# Patient Record
Sex: MALE | Race: WHITE | Employment: OTHER | URBAN - METROPOLITAN AREA
[De-identification: names, ages, dates, MRNs, and addresses within clinical notes are randomized per-mention and may not be internally consistent; named-entity substitution may affect disease eponyms.]

---

## 2018-09-10 NOTE — PATIENT DISCUSSION
start lid hygiene BID and Maxitrol ointment QHS OD and Pred Forte Gtts OD QID all scripts issued to patient.

## 2019-10-05 ENCOUNTER — NEW PATIENT (OUTPATIENT)
Dept: URBAN - METROPOLITAN AREA CLINIC 26 | Facility: CLINIC | Age: 84
End: 2019-10-05

## 2019-10-05 VITALS — HEIGHT: 67 IN | WEIGHT: 103 LBS | BODY MASS INDEX: 16.17 KG/M2

## 2019-10-05 DIAGNOSIS — H35.341: ICD-10-CM

## 2019-10-05 DIAGNOSIS — H04.123: ICD-10-CM

## 2019-10-05 DIAGNOSIS — H35.373: ICD-10-CM

## 2019-10-05 DIAGNOSIS — H02.834: ICD-10-CM

## 2019-10-05 DIAGNOSIS — G45.3: ICD-10-CM

## 2019-10-05 DIAGNOSIS — H43.813: ICD-10-CM

## 2019-10-05 DIAGNOSIS — H02.831: ICD-10-CM

## 2019-10-05 DIAGNOSIS — H35.3131: ICD-10-CM

## 2019-10-05 PROCEDURE — 99204 OFFICE O/P NEW MOD 45 MIN: CPT

## 2019-10-05 PROCEDURE — 92250 FUNDUS PHOTOGRAPHY W/I&R: CPT

## 2019-10-05 ASSESSMENT — VISUAL ACUITY
OS_CC: 20/25
OD_CC: 20/25

## 2019-10-05 ASSESSMENT — TONOMETRY
OD_IOP_MMHG: 14
OS_IOP_MMHG: 20

## 2019-10-22 ENCOUNTER — ADDENDUM (OUTPATIENT)
Dept: URBAN - METROPOLITAN AREA CLINIC 26 | Facility: CLINIC | Age: 84
End: 2019-10-22

## 2022-06-04 ENCOUNTER — TELEPHONE ENCOUNTER (OUTPATIENT)
Dept: URBAN - METROPOLITAN AREA CLINIC 68 | Facility: CLINIC | Age: 87
End: 2022-06-04

## 2022-06-04 RX ORDER — THIAMINE HCL 50 MG
VITAMIN B-1( 250MG ORAL  AS DIRECTED ) INACTIVE -HX ENTRY TABLET ORAL AS DIRECTED
OUTPATIENT
Start: 2019-03-18

## 2022-06-04 RX ORDER — POLYETHYLENE GLYCOL 3350 17 G/DOSE
MIRALAX(  ORAL 3 GM QD PRN AS NEEDED ) INACTIVE -HX ENTRY POWDER (GRAM) ORAL AS NEEDED
OUTPATIENT
Start: 2019-03-18

## 2022-06-04 RX ORDER — METRONIDAZOLE 500 MG/1
TABLET, FILM COATED ORAL
Qty: 21 | Refills: 0 | OUTPATIENT
Start: 2019-03-29 | End: 2019-04-05

## 2022-06-04 RX ORDER — VANCOMYCIN HYDROCHLORIDE 125 MG/1
CAPSULE ORAL EVERY 6 HOURS
Qty: 40 | Refills: 0 | OUTPATIENT
Start: 2019-03-25 | End: 2019-04-04

## 2022-06-04 RX ORDER — FIDAXOMICIN 200 MG/1
TABLET, FILM COATED ORAL
Qty: 28 | Refills: 0 | OUTPATIENT
Start: 2019-04-08 | End: 2019-04-22

## 2022-06-04 RX ORDER — OXYCODONE HYDROCHLORIDE AND ACETAMINOPHEN 7.5; 325 MG/1; MG/1
PERCOCET( 7.5-325MG ORAL  AS NEEDED ) INACTIVE -HX ENTRY TABLET ORAL AS NEEDED
OUTPATIENT
Start: 2019-03-18

## 2022-06-05 ENCOUNTER — TELEPHONE ENCOUNTER (OUTPATIENT)
Dept: URBAN - METROPOLITAN AREA CLINIC 68 | Facility: CLINIC | Age: 87
End: 2022-06-05

## 2022-06-05 RX ORDER — BETA-CAROTENE(A)-C,E/SELENIUM
ANTIOXIDANT(  ORAL  AS DIRECTED ) ACTIVE -HX ENTRY CAPSULE ORAL AS DIRECTED
Status: ACTIVE | COMMUNITY
Start: 2019-04-22

## 2022-06-05 RX ORDER — HYDROCODONE BITARTRATE AND ACETAMINOPHEN 7.5; 325 MG/1; MG/1
HYDROCODONE-ACETAMINOPHEN( 7.5-325MG ORAL 1/2 TAB  AS NEEDED ) ACTIVE -HX ENTRY TABLET ORAL AS NEEDED
Status: ACTIVE | COMMUNITY
Start: 2019-04-22

## 2022-06-05 RX ORDER — MONTELUKAST SODIUM 10 MG/1
MONTELUKAST SODIUM( 10MG ORAL  DAILY ) ACTIVE -HX ENTRY TABLET, FILM COATED ORAL DAILY
Status: ACTIVE | COMMUNITY
Start: 2019-04-22

## 2022-06-05 RX ORDER — DIAZEPAM 5 MG/1
VALIUM( 5MG ORAL 1/2 TAB AS NEEDED ) ACTIVE -HX ENTRY TABLET ORAL AS NEEDED
Status: ACTIVE | COMMUNITY
Start: 2019-04-22

## 2022-06-05 RX ORDER — LOSARTAN POTASSIUM 50 MG/1
LOSARTAN POTASSIUM( 50MG ORAL  DAILY ) ACTIVE -HX ENTRY TABLET ORAL DAILY
Status: ACTIVE | COMMUNITY
Start: 2019-04-22

## 2022-06-05 RX ORDER — CYCLOBENZAPRINE HYDROCHLORIDE 10 MG/10MG
CYCLOBENZAPRINE HCL( 10MG ORAL  AS NEEDED ) ACTIVE -HX ENTRY TABLET ORAL AS NEEDED
Status: ACTIVE | COMMUNITY
Start: 2019-04-22

## 2022-06-05 RX ORDER — METHOCARBAMOL 500 MG/1
METHOCARBAMOL( 500MG ORAL  AS NEEDED ) ACTIVE -HX ENTRY TABLET, FILM COATED ORAL AS NEEDED
Status: ACTIVE | COMMUNITY
Start: 2019-04-22

## 2022-06-05 RX ORDER — LOVASTATIN 10 MG/1
LOVASTATIN( 10MG ORAL  DAILY ) ACTIVE -HX ENTRY TABLET ORAL DAILY
Status: ACTIVE | COMMUNITY
Start: 2019-04-22

## 2022-06-05 RX ORDER — ASCORBIC ACID 1000 MG
MAGNESIUM OXIDE( 250MG ORAL  AS DIRECTED ) ACTIVE -HX ENTRY TABLET ORAL AS DIRECTED
Status: ACTIVE | COMMUNITY
Start: 2019-04-22

## 2022-06-05 RX ORDER — ASPIRIN 81 MG/1
ASPIRIN EC( 81MG ORAL  DAILY ) ACTIVE -HX ENTRY TABLET ORAL DAILY
Status: ACTIVE | COMMUNITY
Start: 2019-04-22

## 2022-06-05 RX ORDER — METFORMIN HYDROCHLORIDE 500 MG/1
METFORMIN HCL( 500MG ORAL 250MG DAILY ) ACTIVE -HX ENTRY TABLET, COATED ORAL DAILY
Status: ACTIVE | COMMUNITY
Start: 2019-04-22

## 2022-06-05 RX ORDER — FIDAXOMICIN 200 MG/1
DIFICID( 200MG ORAL  TWO TIMES DAILY ) ACTIVE -HX ENTRY TABLET, FILM COATED ORAL
Status: ACTIVE | COMMUNITY
Start: 2019-04-22

## 2022-06-25 ENCOUNTER — TELEPHONE ENCOUNTER (OUTPATIENT)
Age: 87
End: 2022-06-25

## 2022-06-25 RX ORDER — THIAMINE HCL 50 MG
VITAMIN B-1( 250MG ORAL  AS DIRECTED ) INACTIVE -HX ENTRY TABLET ORAL AS DIRECTED
OUTPATIENT
Start: 2019-03-18

## 2022-06-25 RX ORDER — OXYCODONE HYDROCHLORIDE AND ACETAMINOPHEN 7.5; 325 MG/1; MG/1
PERCOCET( 7.5-325MG ORAL  AS NEEDED ) INACTIVE -HX ENTRY TABLET ORAL AS NEEDED
OUTPATIENT
Start: 2019-03-18

## 2022-06-25 RX ORDER — ERGOCALCIFEROL 1.25 MG/1
VITAMIN D( 400UNIT ORAL  AS DIRECTED ) INACTIVE -HX ENTRY CAPSULE ORAL AS DIRECTED
OUTPATIENT
Start: 2019-03-18

## 2022-06-25 RX ORDER — VANCOMYCIN HYDROCHLORIDE 125 MG/1
CAPSULE ORAL EVERY 6 HOURS
Qty: 40 | Refills: 0 | OUTPATIENT
Start: 2019-03-25 | End: 2019-04-04

## 2022-06-25 RX ORDER — LORATADINE 5 MG
MIRALAX(  ORAL 3 GM QD PRN AS NEEDED ) INACTIVE -HX ENTRY TABLET,CHEWABLE ORAL AS NEEDED
OUTPATIENT
Start: 2019-03-18

## 2022-06-25 RX ORDER — FIDAXOMICIN 200 MG/1
TABLET, FILM COATED ORAL
Qty: 28 | Refills: 0 | OUTPATIENT
Start: 2019-04-08 | End: 2019-04-22

## 2022-06-26 ENCOUNTER — TELEPHONE ENCOUNTER (OUTPATIENT)
Age: 87
End: 2022-06-26

## 2022-06-26 RX ORDER — BETA-CAROTENE(A)-C,E/SELENIUM
ANTIOXIDANT(  ORAL  AS DIRECTED ) ACTIVE -HX ENTRY CAPSULE ORAL AS DIRECTED
Status: ACTIVE | COMMUNITY
Start: 2019-04-22

## 2022-06-26 RX ORDER — METHOCARBAMOL 500 MG/1
METHOCARBAMOL( 500MG ORAL  AS NEEDED ) ACTIVE -HX ENTRY TABLET, FILM COATED ORAL AS NEEDED
Status: ACTIVE | COMMUNITY
Start: 2019-04-22

## 2022-06-26 RX ORDER — MONTELUKAST 10 MG/1
MONTELUKAST SODIUM( 10MG ORAL  DAILY ) ACTIVE -HX ENTRY TABLET, FILM COATED ORAL DAILY
Status: ACTIVE | COMMUNITY
Start: 2019-04-22

## 2022-06-26 RX ORDER — ASPIRIN 81 MG
ASPIRIN EC( 81MG ORAL  DAILY ) ACTIVE -HX ENTRY TABLET, DELAYED RELEASE (ENTERIC COATED) ORAL DAILY
Status: ACTIVE | COMMUNITY
Start: 2019-04-22

## 2022-06-26 RX ORDER — HYDROCODONE BITARTRATE AND ACETAMINOPHEN 325; 7.5 MG/1; MG/1
HYDROCODONE-ACETAMINOPHEN( 7.5-325MG ORAL 1/2 TAB  AS NEEDED ) ACTIVE -HX ENTRY TABLET ORAL AS NEEDED
Status: ACTIVE | COMMUNITY
Start: 2019-04-22

## 2022-06-26 RX ORDER — CHLORHEXIDINE GLUCONATE 4 %
MAGNESIUM OXIDE( 250MG ORAL  AS DIRECTED ) ACTIVE -HX ENTRY LIQUID (ML) TOPICAL AS DIRECTED
Status: ACTIVE | COMMUNITY
Start: 2019-04-22

## 2022-06-26 RX ORDER — CALCIUM CARBONATE/VITAMIN D3 600 MG-10
CALCIUM( 500MG ORAL  AS DIRECTED ) ACTIVE -HX ENTRY TABLET ORAL AS DIRECTED
Status: ACTIVE | COMMUNITY
Start: 2019-04-22

## 2022-06-26 RX ORDER — METFORMIN HCL 500 MG/1
METFORMIN HCL( 500MG ORAL 250MG DAILY ) ACTIVE -HX ENTRY TABLET ORAL DAILY
Status: ACTIVE | COMMUNITY
Start: 2019-04-22

## 2022-06-26 RX ORDER — CYCLOBENZAPRINE HYDROCHLORIDE 10 MG/1
CYCLOBENZAPRINE HCL( 10MG ORAL  AS NEEDED ) ACTIVE -HX ENTRY TABLET, FILM COATED ORAL AS NEEDED
Status: ACTIVE | COMMUNITY
Start: 2019-04-22

## 2022-06-26 RX ORDER — FIDAXOMICIN 200 MG/1
DIFICID( 200MG ORAL  TWO TIMES DAILY ) ACTIVE -HX ENTRY TABLET, FILM COATED ORAL
Status: ACTIVE | COMMUNITY
Start: 2019-04-22

## 2022-06-26 RX ORDER — DIAZEPAM 5 MG/1
VALIUM( 5MG ORAL 1/2 TAB AS NEEDED ) ACTIVE -HX ENTRY TABLET ORAL AS NEEDED
Status: ACTIVE | COMMUNITY
Start: 2019-04-22

## 2022-06-26 RX ORDER — LOSARTAN POTASSIUM 50 MG/1
LOSARTAN POTASSIUM( 50MG ORAL  DAILY ) ACTIVE -HX ENTRY TABLET, FILM COATED ORAL DAILY
Status: ACTIVE | COMMUNITY
Start: 2019-04-22

## 2022-06-26 RX ORDER — LOVASTATIN 10 MG/1
LOVASTATIN( 10MG ORAL  DAILY ) ACTIVE -HX ENTRY TABLET ORAL DAILY
Status: ACTIVE | COMMUNITY
Start: 2019-04-22